# Patient Record
Sex: MALE | Race: WHITE | URBAN - METROPOLITAN AREA
[De-identification: names, ages, dates, MRNs, and addresses within clinical notes are randomized per-mention and may not be internally consistent; named-entity substitution may affect disease eponyms.]

---

## 2018-09-08 ENCOUNTER — OFFICE VISIT (OUTPATIENT)
Dept: URGENT CARE | Facility: URGENT CARE | Age: 58
End: 2018-09-08
Payer: COMMERCIAL

## 2018-09-08 VITALS
TEMPERATURE: 98.4 F | HEART RATE: 76 BPM | RESPIRATION RATE: 20 BRPM | WEIGHT: 271 LBS | SYSTOLIC BLOOD PRESSURE: 140 MMHG | DIASTOLIC BLOOD PRESSURE: 90 MMHG

## 2018-09-08 DIAGNOSIS — L03.032 CELLULITIS OF TOE OF LEFT FOOT: Primary | ICD-10-CM

## 2018-09-08 PROCEDURE — 99203 OFFICE O/P NEW LOW 30 MIN: CPT | Performed by: FAMILY MEDICINE

## 2018-09-08 RX ORDER — SULFAMETHOXAZOLE/TRIMETHOPRIM 800-160 MG
1 TABLET ORAL 2 TIMES DAILY
Qty: 14 TABLET | Refills: 0 | Status: SHIPPED | OUTPATIENT
Start: 2018-09-08

## 2018-09-08 RX ORDER — ATORVASTATIN CALCIUM 20 MG/1
20 TABLET, FILM COATED ORAL DAILY
COMMUNITY

## 2018-09-08 NOTE — MR AVS SNAPSHOT
"              After Visit Summary   9/8/2018    Fady Hansen    MRN: [id number]           Patient Information     Date Of Birth          1960        Visit Information        Provider Department      9/8/2018 7:00 PM Oswaldo Vega MD [facility]        Today's Diagnoses     Cellulitis of toe of left foot    -  1      Care Instructions    Should see improvement within 2 days    Take antibiotic twice a day for a week    Do warm foot baths for 15 minute periods at a time two times a day      Return if worsening pain or swelling or developing fevers          Follow-ups after your visit        Who to contact     If you have questions or need follow up information about today's clinic visit or your schedule please contact [facility] directly at [phone].  Normal or non-critical lab and imaging results will be communicated to you by MyChart, letter or phone within 4 business days after the clinic has received the results. If you do not hear from us within 7 days, please contact the clinic through MyChart or phone. If you have a critical or abnormal lab result, we will notify you by phone as soon as possible.  Submit refill requests through [employer] [facility] or call your pharmacy and they will forward the refill request to us. Please allow 3 business days for your refill to be completed.          Additional Information About Your Visit        MyChart Information     [employer] [facility] lets you send messages to your doctor, view your test results, renew your prescriptions, schedule appointments and more. To sign up, go to [email or web address] . Click on \"Log in\" on the left side of the screen, which will take you to the Welcome page. Then click on \"Sign up Now\" on the right side of the page.     You will be asked to enter the access code listed below, as well as some personal information. Please follow the directions to create your username and password.     Your " access code is: [email or web address]  Expires: 2018  7:52 PM     Your access code will  in 90 days. If you need help or a new code, please call your [place] clinic or [phone].        Care EveryWhere ID     This is your Care EveryWhere ID. This could be used by other organizations to access your [facility] medical records  [id number]        Your Vitals Were     Pulse Temperature Respirations             76 98.4  F (36.9  C) (Oral) 20          Blood Pressure from Last 3 Encounters:   18 140/90    Weight from Last 3 Encounters:   18 271 lb (122.9 kg)              Today, you had the following     No orders found for display         Today's Medication Changes          These changes are accurate as of 18  7:52 PM.  If you have any questions, ask your nurse or doctor.               Start taking these medicines.        Dose/Directions    sulfamethoxazole-trimethoprim 800-160 MG per tablet   Commonly known as:  BACTRIM DS/SEPTRA DS   Used for:  Cellulitis of toe of left foot   Started by:  Oswaldo Vega MD        Dose:  1 tablet   Take 1 tablet by mouth 2 times daily   Quantity:  14 tablet   Refills:  0            Where to get your medicines      [facility] [id number] [place] [address] [address] [facility]  [address], [facility] [address]     Phone:  [phone]     sulfamethoxazole-trimethoprim 800-160 MG per tablet                Primary Care Provider Fax #    Physician No Ref-Primary [phone]       No address on file        Equal Access to Services     [facility]: Hadii ru phillips hadasho Soomaali, waaxda luqadaha, qaybta kaalmada ademariama, vicenta holman. So [facility] [phone].    ATENCIÓN: Si habla español, tiene a hdz disposición servicios gratuitos de asistencia lingüística. Llame al [phone].    We comply with applicable federal civil rights laws and [place] laws. We do not  discriminate on the basis of race, color, national origin, age, disability, sex, sexual orientation, or gender identity.            Thank you!     Thank you for choosing [facility]  for your care. Our goal is always to provide you with excellent care. Hearing back from our patients is one way we can continue to improve our services. Please take a few minutes to complete the written survey that you may receive in the mail after your visit with us. Thank you!             Your Updated Medication List - Protect others around you: Learn how to safely use, store and throw away your medicines at www.disposemymeds.org.          This list is accurate as of 9/8/18  7:52 PM.  Always use your most recent med list.                   Brand Name Dispense Instructions for use Diagnosis    atorvastatin 20 MG tablet    LIPITOR     Take 20 mg by mouth daily        METFORMIN HCL PO      Take 500 mg by mouth 2 times daily (with meals)        SERTRALINE HCL PO      Take 100 mg by mouth daily        sulfamethoxazole-trimethoprim 800-160 MG per tablet    BACTRIM DS/SEPTRA DS    14 tablet    Take 1 tablet by mouth 2 times daily    Cellulitis of toe of left foot

## 2018-09-09 NOTE — PROGRESS NOTES
Subjective:   Fady Hansen is a 58 year old male who presents for   Chief Complaint   Patient presents with     Toe Pain     injury to lt great toe few months kelsey,has redness,swelling since yesterday     Redness of toe started yesterday and now having pressure. No drainage.  No fevers. Does foot checks daily and no open wounds. The toe nail came apart two months ago and has regrown .     T2DM: Daily blood sugar checks range between 120-170  Metformin main medication  Last A1c he reports at 6.9    SH: recent moved here from [place], [place]. Non-smoker  Surgery: non-contributory, hx of ACL repair, appendectomy  PMH: hyperlipidemia,     PMHX/PSHX/MEDS/ALLERGIES/SHX/FHX reviewed in Epic.    There are no active problems to display for this patient.      Current Outpatient Prescriptions   Medication     atorvastatin (LIPITOR) 20 MG tablet     METFORMIN HCL PO     SERTRALINE HCL PO     sulfamethoxazole-trimethoprim (BACTRIM DS/SEPTRA DS) 800-160 MG per tablet     No current facility-administered medications for this visit.      ROS:  As above per HPI    Objective:   /90 (Cuff Size: Adult Large)  Pulse 76  Temp 98.4  F (36.9  C) (Oral)  Resp 20  Wt 271 lb (122.9 kg), There is no height or weight on file to calculate BMI.  Gen:  NAD, well-nourished, sitting in chair comfortably  HEENT: EOMI, sclera anicteric, Head normocephalic, ; nares patent; moist mucous membranes  Neck: trachea midline, no thyromegaly  CV:  Hemodynamically stable, RRR  Pulm:  no increased work of breathing   Extrem: no cyanosis, edema or clubbing  Skin: erythema and swelling of Left great toe, intact sensation to touch  Psych: Euthymic, linear thoughts, normal rate of speech            No results found for this or any previous visit.    Assessment & Plan:   Fady Hansen, 58 year old male who presents with:  Cellulitis of toe of left foot  Close follow-up recommended for this early development of infection Could not identify source of the  infection on inspection. Will start on bactrim at this time for 1 week's course.   - sulfamethoxazole-trimethoprim (BACTRIM DS/SEPTRA DS) 800-160 MG per tablet  Dispense: 14 tablet; Refill: 0    T2DM: Moderate control. Still has some significant highs. Recommended routine monitoring, daily foot checks, adherence to metformin, diet control.     Oswaldo Vega MD   [facility]     Options for treatment and/or follow-up care were reviewed with the patient. Fady Hnasen and/or legal guardian was engaged and actively involved in the decision making process. Patient/guardian verbalized understanding of the options discussed and was satisfied with the final plan.

## 2018-09-09 NOTE — PATIENT INSTRUCTIONS
Should see improvement within 2 days    Take antibiotic twice a day for a week    Do warm foot baths for 15 minute periods at a time two times a day      Return if worsening pain or swelling or developing fevers